# Patient Record
(demographics unavailable — no encounter records)

---

## 2025-05-30 NOTE — ASSESSMENT
[FreeTextEntry1] : 11-year-old female with pain and swelling in left lower leg.  Today's visit included obtaining the history from the child and parent, due to the child's age, the child could not be considered a reliable historian, requiring the parent to act as an independent historian. The condition, natural history, and prognosis were explained to the family. The clinical findings and images were reviewed with the family. Based on her exam, I am advising the family to obtain an MRI of patient's left lower extremity to rule out any soft tissue injury. My  will contact the family with MRI authorization. No radiographs unless clinically indicated. Further treatment plan to be based on MRI results.  No gym, no sports, rough play until cleared by our clinic. An updated school note was provided.   We will plan to see her back in 2 weeks for MRI review.   All questions and concerns were addressed. Mother vocalized understanding and agreement to assessment and treatment.   I, Shannon Dodson , have acted as a scribe and documented the above information for Dr. Rose  The above documentation completed by the scribe is an accurate record of both my words and actions.

## 2025-05-30 NOTE — DATA REVIEWED
[de-identified] : My review and interpretation of the radiologic studies: X-rays of the left lower extremity were performed from [facility] on 05/09/25, and independently interpreted paper copy: no evidence of acute fractures or dislocations.  US of the left lower extremity was performed from [facility] on 05/09/25, and independently interpreted paper copy: no evidence of DVT.

## 2025-05-30 NOTE — PHYSICAL EXAM
[FreeTextEntry1] : Gait: Presents ambulating independently without signs of antalgia. Good coordination and balance noted. GENERAL: alert, cooperative, in NAD SKIN: The skin is intact, warm, pink and dry over the area examined. EYES: Normal conjunctiva, normal eyelids and pupils were equal and round. ENT: normal ears, normal nose and normal lips. CARDIOVASCULAR: brisk capillary refill, but no peripheral edema. RESPIRATORY: The patient is in no apparent respiratory distress. They're taking full deep breaths without use of accessory muscles or evidence of audible wheezes or stridor without the use of a stethoscope. Normal respiratory effort. ABDOMEN: not examined  Left Lower Extremity: - No skin irritation or breakdown. - No gross deformity. - Swelling noted. - Thick tissue noted. - Able to fully flex and extend all toes without discomfort. - Full ROM of the knee and ankle. - No discomfort with gentle passive ankle DF/PF. - Toes are warm and appear well perfused with brisk capillary refill -+2 DP pulse - Sensation is grossly intact

## 2025-05-30 NOTE — REASON FOR VISIT
[Initial Evaluation] : an initial evaluation [Mother] : mother [FreeTextEntry1] : pain and swelling in left lower leg

## 2025-05-30 NOTE — REVIEW OF SYSTEMS
[Change in Activity] : change in activity [Fever Above 102] : no fever [Rash] : no rash [Itching] : no itching [Nasal Stuffiness] : no nasal congestion [Sore Throat] : no sore throat [Heart Problems] : no heart problems [Murmur] : no murmur

## 2025-05-30 NOTE — DATA REVIEWED
[de-identified] : My review and interpretation of the radiologic studies: X-rays of the left lower extremity were performed from [facility] on 05/09/25, and independently interpreted paper copy: no evidence of acute fractures or dislocations.  US of the left lower extremity was performed from [facility] on 05/09/25, and independently interpreted paper copy: no evidence of DVT.

## 2025-05-30 NOTE — HISTORY OF PRESENT ILLNESS
[FreeTextEntry1] : 11-year-old female who presents today with her mother for initial evaluation of pain and swelling in the left lower leg. Mother states that they went to [place] 6 weeks ago and she walked a lot over there, following that she noticed swelling of her left lower leg. Denies any fever or chills. She was initially seen by pediatrician who ordered X-Rays, US of left lower extremity, which was performed from [facility] radiology and confirmed no fracture and recommended for orthopedic evaluation. Today mother reports that she has mild discomfort or pain in the left lower extremity. Denies any tingling sensation or radiating pain. She is not taking any pain medication now. Mother also reports that pediatrician ordered some lab test which was negative also. Here for further orthopedic evaluation.

## 2025-05-30 NOTE — HISTORY OF PRESENT ILLNESS
[FreeTextEntry1] : 11-year-old female who presents today with her mother for initial evaluation of pain and swelling in the left lower leg. Mother states that they went to [place] 6 weeks ago and she walked a lot over there, following that she noticed swelling of her left lower leg. Denies any fever or chills. She was initially seen by pediatrician who ordered X-Rays, US of left lower extremity, which was performed from [facility] radiology and confirmed no fracture and recommended for orthopedic evaluation. Today mother reports that she has mild discomfort or pain in the left lower extremity. Denies any tingling sensation or radiating pain. She is not taking any pain medication now. Mother also reports that pediatrician ordered some lab test which was negative also. Here for further orthopedic evaluation.

## 2025-06-10 NOTE — DATA REVIEWED
[de-identified] : My review and interpretation of the radiologic studies:  MRI Left tibula/fibula on 6/01/2025: FINDINGS: LOCALIZER: No additional findings. OSSEOUS STRUCTURES: There is no fracture. VISUALIZED JOINTS: There is articular cartilage is preserved. MUSCLES AND TENDONS: Tortuous vasculature interspersed with fat occupying the entirety of the peroneus musculature spanning a 5 x 3 9.2 0.8 cm consistent with intramuscular vascular malformation. NERVES: The visualized nerves are preserved. SUBCUTANEOUS TISSUES: Normal. 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  My review and interpretation of the radiologic studies: X-rays of the left lower extremity were performed from [facility] on 05/09/25, and independently interpreted paper copy: no evidence of acute fractures or dislocations.  US of the left lower extremity was performed from [facility] on 05/09/25, and independently interpreted paper copy: no evidence of DVT.

## 2025-06-10 NOTE — ASSESSMENT
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  Today's visit included obtaining the history from the child and parent, due to the child's age, the child could not be considered a reliable historian, requiring the parent to act as an independent historian. The condition, natural history, and prognosis were explained to the family. The clinical findings and images were reviewed with the family. 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 Recommendation at this time is to seek evaluation by pediatric hematology/oncology to help us determine whether medical intervention is necessary. Contact information provided for Dr. Nella Maki. We discussed conservative management vs surgical treatment if she develops significant pain or impact on quality of life. Discussed use of compression stockings.  Patient may continue participating in all physical activities without restrictions within her limits of pain and comfort. All questions and concerns were addressed. Mother vocalized understanding and agreement to assessment and treatment. Follow up in 6 months for clinical reevaluation to monitor.   Documented by Kelsie Ruiz acting as a scribe for Dr. Rose on 06/10/2025.  The above documentation completed by the scribe is an accurate record of both my words and actions.

## 2025-06-10 NOTE — REASON FOR VISIT
[Mother] : mother [Follow Up] : a follow up visit [FreeTextEntry1] : pain and swelling in left lower leg

## 2025-06-10 NOTE — REVIEW OF SYSTEMS
[Change in Activity] : change in activity [Fever Above 102] : no fever [Rash] : no rash [Itching] : no itching [Nasal Stuffiness] : no nasal congestion [Heart Problems] : no heart problems [Sore Throat] : no sore throat [Murmur] : no murmur

## 2025-06-10 NOTE — HISTORY OF PRESENT ILLNESS
[FreeTextEntry1] : 11-year-old female who presents today with her mother for evaluation of pain and swelling in the left lower leg. Mother states that they went to [place] in April 2025 and she walked a lot over there, following that she noticed swelling of her left lower leg. Denies any fever or chills. She was initially seen by pediatrician who ordered X-Rays, US of left lower extremity, which was performed from [facility] radiology and confirmed no fracture and recommended for orthopedic evaluation. Initially seen in clinic on 5/28/2025. We recommended MRI of the left tibula/fibula for further imaging. She presents today overall doing the same. She still has the swelling to the left lower leg. It is only painful to touch. No pain with activities.  Denies any tingling sensation or radiating pain. She is not taking any pain medication now. Mother also reports that pediatrician ordered some lab test which was negative also. Here for further orthopedic evaluation and MRI review.

## 2025-06-10 NOTE — PHYSICAL EXAM
[FreeTextEntry1] : Gait: Presents ambulating independently without signs of antalgia. Good coordination and balance noted. GENERAL: alert, cooperative, in NAD SKIN: The skin is intact, warm, pink and dry over the area examined. EYES: Normal conjunctiva, normal eyelids and pupils were equal and round. ENT: normal ears, normal nose and normal lips. CARDIOVASCULAR: brisk capillary refill, but no peripheral edema. RESPIRATORY: The patient is in no apparent respiratory distress. They're taking full deep breaths without use of accessory muscles or evidence of audible wheezes or stridor without the use of a stethoscope. Normal respiratory effort. ABDOMEN: not examined  Left Lower Extremity: - No skin irritation or breakdown. - No gross deformity. - Swelling noted. - Thick tissue noted. - TTP over lateral lower leg - Able to fully flex and extend all toes without discomfort. - Full ROM of the knee and ankle. - No discomfort with gentle passive ankle DF/PF. - Toes are warm and appear well perfused with brisk capillary refill -+2 DP pulse - Sensation is grossly intact

## 2025-07-07 NOTE — REVIEW OF SYSTEMS
[Cough] : cough [Fever] : no fever [Chills] : no chills [Fatigue] : no fatigue [Rash] : no rash [Petechiae] : no petechiae [Ecchymoses] : no ecchymoses [Hemangioma] : no hemangioma [Joint Pain] : no joint pain [de-identified] : +swelling of the left leg

## 2025-07-07 NOTE — PHYSICAL EXAM
[No focal deficits] : no focal deficits [Normal] : affect appropriate [de-identified] : +coughing, inspiratory and expiratory wheezing bilateral lung fields, occasionally cleared with cough.  [de-identified] : L lateral calf with mild swelling, and firmness. No skin changes. No tenderness.

## 2025-07-07 NOTE — REVIEW OF SYSTEMS
[Cough] : cough [Fever] : no fever [Chills] : no chills [Fatigue] : no fatigue [Rash] : no rash [Petechiae] : no petechiae [Ecchymoses] : no ecchymoses [Hemangioma] : no hemangioma [Joint Pain] : no joint pain [de-identified] : +swelling of the left leg

## 2025-07-07 NOTE — HISTORY OF PRESENT ILLNESS
[de-identified] : Savannah is a healthy 10yo F who presents today for evaluation of a vascular anomaly of the left lower extremity. Mom reports that at the end of April, they had visited [place] and Savannah was doing a lot of walking there. After they returned, they noticed the left lower leg was swollen, and Savannah experienced pain with palpation of the lateral leg. She denies pain at rest or pain with walking long distances - only with palpation. She doesn't notice color changes to the leg or skin changes. Mom denies any issues with the leg growing up. She states the pediatrician has been happy with her growth over the years. The PCP sent her to [facility] radiology for a leg XR and ultrasound. Mom doesn't have the reports or images. Mom says the imaging reports were negative for fracture. She was then referred to pediatric orthopedics for evaluation. She saw Dr. Rose who ordered an MRI. MRI without contrast done on 6/10 which showed an intramuscular vascular malformation of the peroneus muscle. He then referred her to us for evaluation  Also of note today is that Savannah has been coughing for a few weeks, no fever, no medications given.   PMHx: seizure-like activity (b/l leg stiffening) at age 3, worked up at [employer], mom reports she had a brain MRI which was negative, and it never happened again, without treatment.  PSHx: None Family hx: no family hx of bleeding disorder, blood clots Meds: None Allergies: None

## 2025-07-07 NOTE — SOCIAL HISTORY
[FreeTextEntry6] : likes watching [employer] videos. not very physically active.  one half-sibling and one step sibling.

## 2025-07-07 NOTE — RESULTS/DATA
[FreeTextEntry1] : Reviewed MRI with Dr. Rodriguez - nature of the vascular anomaly somewhat limited by lack of IV contrast. No fatty overgrowth component. 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

## 2025-07-07 NOTE — PHYSICAL EXAM
[No focal deficits] : no focal deficits [Normal] : affect appropriate [de-identified] : +coughing, inspiratory and expiratory wheezing bilateral lung fields, occasionally cleared with cough.  [de-identified] : L lateral calf with mild swelling, and firmness. No skin changes. No tenderness.

## 2025-07-07 NOTE — HISTORY OF PRESENT ILLNESS
[de-identified] : Savannah is a healthy 12yo F who presents today for evaluation of a vascular anomaly of the left lower extremity. Mom reports that at the end of April, they had visited [place] and Savannah was doing a lot of walking there. After they returned, they noticed the left lower leg was swollen, and Savannah experienced pain with palpation of the lateral leg. She denies pain at rest or pain with walking long distances - only with palpation. She doesn't notice color changes to the leg or skin changes. Mom denies any issues with the leg growing up. She states the pediatrician has been happy with her growth over the years. The PCP sent her to [facility] radiology for a leg XR and ultrasound. Mom doesn't have the reports or images. Mom says the imaging reports were negative for fracture. She was then referred to pediatric orthopedics for evaluation. She saw Dr. Rose who ordered an MRI. MRI without contrast done on 6/10 which showed an intramuscular vascular malformation of the peroneus muscle. He then referred her to us for evaluation  Also of note today is that Savannah has been coughing for a few weeks, no fever, no medications given.   PMHx: seizure-like activity (b/l leg stiffening) at age 3, worked up at [employer], mom reports she had a brain MRI which was negative, and it never happened again, without treatment.  PSHx: None Family hx: no family hx of bleeding disorder, blood clots Meds: None Allergies: None

## 2025-07-07 NOTE — SOCIAL HISTORY
[FreeTextEntry6] : likes watching [employer] videos. not very physically active.  one half-sibling and one step sibling.

## 2025-07-07 NOTE — REASON FOR VISIT
[New Patient Visit] : a new patient visit for [Other ___] : [unfilled] [Patient] : patient [Mother] : mother [Medical Records] : medical records